# Patient Record
Sex: FEMALE | Race: WHITE | Employment: OTHER | URBAN - METROPOLITAN AREA
[De-identification: names, ages, dates, MRNs, and addresses within clinical notes are randomized per-mention and may not be internally consistent; named-entity substitution may affect disease eponyms.]

---

## 2022-03-19 PROBLEM — R05.3 CHRONIC COUGH: Status: ACTIVE | Noted: 2019-06-10

## 2022-05-26 DIAGNOSIS — E03.9 PRIMARY HYPOTHYROIDISM: Primary | ICD-10-CM

## 2022-06-09 ENCOUNTER — HOSPITAL ENCOUNTER (OUTPATIENT)
Dept: GENERAL RADIOLOGY | Age: 67
Discharge: HOME | End: 2022-06-12
Payer: COMMERCIAL

## 2022-06-09 DIAGNOSIS — R05.9 COUGH: ICD-10-CM

## 2022-06-09 DIAGNOSIS — K21.9 GASTROESOPHAGEAL REFLUX DISEASE WITHOUT ESOPHAGITIS: ICD-10-CM

## 2022-06-09 DIAGNOSIS — R49.0 DYSPHONIA: ICD-10-CM

## 2022-06-09 PROCEDURE — 74230 X-RAY XM SWLNG FUNCJ C+: CPT

## 2022-06-09 PROCEDURE — 2500000003 HC RX 250 WO HCPCS: Performed by: OTOLARYNGOLOGY

## 2022-06-09 PROCEDURE — 92611 MOTION FLUOROSCOPY/SWALLOW: CPT

## 2022-06-09 RX ADMIN — BARIUM SULFATE 15 ML: 400 PASTE ORAL at 09:16

## 2022-06-09 RX ADMIN — BARIUM SULFATE 45 ML: 980 POWDER, FOR SUSPENSION ORAL at 09:16

## 2022-06-09 NOTE — THERAPY EVALUATION
Efren Bradford  : 1955  Primary: [facility]  Secondary:   MRN: [id number] [facility] [id number]  Phone: [phone]    Visit Info:    Date 2022   SPEECH LANGUAGE PATHOLOGY: MODIFIED BARIUM SWALLOW STUDY     Appt Desk   Episode      Treatment Diagnosis:  R13.14 Dysphagia, Pharyngoesophageal Phase    Medical/Referring Diagnosis:  Cough [R05.9]  Dysphonia [R49.0]  Gastroesophageal reflux disease without esophagitis [K21.9]  Referring Physician:  Neal Flores DO  Radiologist: Dr. Marycarmen Rodriguez MD Orders: Modified Barium Swallow  Date of Onset:  No data recorded   Allergies:  Morphine and Tramadol         PAST MEDICAL HISTORY:   Ms. Joaquin Zuluaga is a 79 y.o. female who  has a past medical history of Cervical disc disease, Chronic cough, Hypertension, Multiple thyroid nodules, Nephrolithiasis, and Primary hypothyroidism. She also  has a past surgical history that includes Hysterectomy; Salpingo-oophorectomy; Cervical discectomy; lumbar discectomy; heent; knee surgery (Left, 2021); Septoplasty; other surgical history; Lithotripsy; bladder suspension; and Cholecystectomy. ASSESSMENT/PLAN OF CARE   Based on the objective data described below, Ms. Joaquin Zuluaga presents with delayed hyolaryngeal elevation but adequate epiglottal inversion. There was intermittent trace transient laryngeal penetration of thin liquids with no aspiration. RECOMMENDATIONS AND PLANNED INTERVENTIONS  DIET:    Regular Consistency   Thin Liquids    MEDICATIONS: As tolerated and Whole with thin liquids    Compensatory Swallowing Strategies/Modifications:   Small bites and sips   Slow rate of PO intake   Bolus hold to improve timing of swallow      GENERAL    Patient pleasant and cooperative. Present dysphagia symptoms: She currently complains of chronic cough that has recently been more pronounced with PO intake.   Current dietary status prior to evaluation today: regular textures, thin liquids  Previous dysphagia or speech therapy intervention (including previous MBS studies): patient has had speech therapy for her cough in the past    OBJECTIVE    Modified barium swallow study was performed in the radiology suite using c-arm with Ms. Carol Tipton in the lateral plane. Oral Motor Assessment  Labial: No impairment  Lingual: No impairment  Dentition: Natural  Oral Hygiene: Adequate    PO trials  Patient was presented with trials of thin liquids (by spoon, cup sip, cup gulp, straw sip and serial swallows), pudding, mixed consistency and cracker. Oral phase:   no significant oral issues observed   premature spillage to pharynx pre-swallow   piecemeal deglutition    Pharyngeal phase:   Swallows of thin liquids were triggered at pyriform sinuses. There was trace and transient laryngeal penetration observed during the swallow intermittently that was cleared spontaneously during the swallow. No aspiration was observed. No pharyngeal residue after swallow.  Swallows of pudding were timely. No laryngeal penetration or aspiration was observed. No pharyngeal residue after swallow.  Swallows of mixed consistencies were piecemealed, triggered at valleculae and with pooling in valleculae and pyriform sinuses for 3-5 seconds prior to swallow initiation. No laryngeal penetration or aspiration was observed. No pharyngeal residue after swallow.  Swallows of cracker were triggered at valleculae and with pooling in valleculae for 3 seconds prior to swallow initiation. No laryngeal penetration or aspiration was observed. No pharyngeal residue after swallow. Pharyngeal characteristics:   functional pharyngeal swallow   adequate retraction of base of tongue   delayed hyolaryngeal elevation/excursion   adequate epiglottic inversion   adequate constriction of posterior pharyngeal wall   reduced laryngeal closure  Aspiration/Penetration Scale:    2 (Penetration/no residue.  Contrast enters the larynx, remains above the folds/cords, and is cleared.)    Upper Esophageal Screen:    decreased relaxation of upper segment of esophagus that does not affect flow  *Distal esophagus not assessed due to limitations of modified barium swallow study. National Outcomes Measure:   SWALLOWING: Level 6:  Swallowing is safe, and the individual eats and drinks independently and may rarely require minimal cueing. The individual usually self-cues when difficulty occurs. May need to avoid specific food items (e.g., popcorn and nuts), or require additional time (due to dysphagia). Patient Education regarding results and recommendations:   Ms. Linda Rosas was educated on the following topics: anatomy and physiology of the swallowing mechanism and results and recommendations from this assessment. All questions were answered and comprehension of education was expressed.     Therapy Time  SLP Individual Minutes  Time In: 0900  Time Out: 0930  Minutes: [address] CARMITA [address]ano

## 2022-06-16 ENCOUNTER — OFFICE VISIT (OUTPATIENT)
Dept: ENDOCRINOLOGY | Age: 67
End: 2022-06-16
Payer: COMMERCIAL

## 2022-06-16 VITALS
SYSTOLIC BLOOD PRESSURE: 154 MMHG | BODY MASS INDEX: 25.16 KG/M2 | WEIGHT: 151 LBS | OXYGEN SATURATION: 96 % | HEIGHT: 65 IN | DIASTOLIC BLOOD PRESSURE: 82 MMHG | HEART RATE: 70 BPM

## 2022-06-16 DIAGNOSIS — E03.9 PRIMARY HYPOTHYROIDISM: Primary | ICD-10-CM

## 2022-06-16 DIAGNOSIS — E04.2 MULTIPLE THYROID NODULES: ICD-10-CM

## 2022-06-16 PROCEDURE — 76536 US EXAM OF HEAD AND NECK: CPT | Performed by: INTERNAL MEDICINE

## 2022-06-16 PROCEDURE — 1123F ACP DISCUSS/DSCN MKR DOCD: CPT | Performed by: INTERNAL MEDICINE

## 2022-06-16 PROCEDURE — 99214 OFFICE O/P EST MOD 30 MIN: CPT | Performed by: INTERNAL MEDICINE

## 2022-06-16 RX ORDER — DIPHENHYDRAMINE HCL 25 MG
25 CAPSULE ORAL EVERY 6 HOURS PRN
COMMUNITY

## 2022-06-16 RX ORDER — PREGABALIN 75 MG/1
CAPSULE ORAL
COMMUNITY
Start: 2022-04-20

## 2022-06-16 RX ORDER — LEVOTHYROXINE SODIUM 0.05 MG/1
50 TABLET ORAL
Qty: 30 TABLET | Refills: 11 | Status: SHIPPED | OUTPATIENT
Start: 2022-06-16 | End: 2023-06-16

## 2022-06-16 ASSESSMENT — ENCOUNTER SYMPTOMS
TROUBLE SWALLOWING: 0
VOICE CHANGE: 0

## 2022-06-16 NOTE — PROGRESS NOTES
/    Loretta Perkins MD, [address]            Reason for visit: follow-up of hypothyroidism and thyroid nodules      ASSESSMENT AND PLAN:    1. Primary hypothyroidism  She has biochemically euthyroid. Continue levothyroxine as prescribed. Return in 1 year with repeat labs. - levothyroxine (SYNTHROID) 50 MCG tablet; Take 1 tablet by mouth every morning (before breakfast)  Dispense: 30 tablet; Refill: 11  - TSH; Future  - T4, Free; Future    2. Multiple thyroid nodules  Ultrasound was repeated today (see below) and is unchanged. Ongoing ultrasound follow-up is not necessary.  - US,HEAD/NECK TISSUES,REAL TIME      PROCEDURES:    HEAD AND NECK ULTRASOUND    Date of study:  6/16/2022    Performing/interpreting physician:  Loretta Perkins MD, FACE    Indication:  thyroid nodule    Technique:  Using a 12 MHz linear transducer, multiple real-time planar images were obtained of the thyroid and surrounding tissues. Findings:  Right lobe 0.84 x 0.99 x 3.50 cm, isthmus 0.31 cm, left lobe 0.75 x 0.96 x 1.84 cm. Homogeneous echotexture. Normal blood flow. 0.18 x 0.24 x 0.30 cm hypoechoic nodule without calcifications or increased blood flow in the midportion of the right lobe (TR 4). 0.39 x 0.37 x 0.52 cm hypoechoic nodule without calcifications or increased blood flow in the mid to lower portion of the left lobe (TR 4). Impression: Subcentimeter TI-RADS 4 nodules. Follow-up and Dispositions    · Return in about 1 year (around 6/16/2023). History of Present Illness:    THYROID DYSFUNCTION  Andrew Bolton is seen for follow-up of hypothyroidism; this was diagnosed in 2017.   She was started on thyroid hormone replacement in December 2018.       Current symptoms: See review of systems below     Prior treatment: Levothyroxine 50 mcg daily was started 12/7/2018.     Pertinent labs:  3/29/2017: TSH 6.230.  8/19/2017: TSH 6.850.  6/21/2018: TSH 5.280.  2018: TSH 9.790.  2019: TSH 1.710, free T4 1.51, antithyroid peroxidase antibodies 18 (-).  2019: TSH 2.48, free T4 1.3.  10/7/2019: TSH 2.83.  2019: TSH 1.41, free T4 1.32.  2020: TSH 1.380, free T4 1.25.  6/10/2021: TSH 2.020, free T4 1.47.  2022: TSH 4.400, free T4 1.02.     [facility] Right lobe 4.2 x 1.2 x 1.0 cm, isthmus 0.25 cm, left lobe 2.9 x 1.0 x 1.0 cm. Multiple bilateral thyroid nodules measuring less than 0.5 cm.     [facility] 24-hour uptake 24.9%. Homogeneous tracer distribution.     2018: Ultrasound ([facility])- Right lobe 1.17 x 1.10 x 3.69 cm, isthmus 0.31 cm, left lobe 1.00 x 1.10 x 3.41 cm. Homogeneous echotexture. 0.17 x 0.22 x 0.29 cm hypoechoic nodule without calcifications or increased blood flow in the midportion of the right lobe. 0.29 x 0.32 x 0.39 cm hypoechoic nodule with sonolucent rim and no calcifications or increased blood flow in the midportion of the left lobe.     6/10/2019: Ultrasound ([facility])- Right lobe 1.19 x 1.15 x 3.70 cm, isthmus 0.18 cm, left lobe 1.22 x 0.97 x 2.63 cm. Mildly heterogeneous echotexture. 0.16 x 0.19 x 0.26 cm hypoechoic nodule in the midportion of the right lobe. 0.26 x 0.31 x 0.36 cm hypoechoic nodule with sonolucent rim and no calcifications or increased blood flow in the midportion of the left lobe.     2020: Ultrasound ([facility])- Right lobe 1.56 x 1.28 x 3.89 cm, isthmus 0.31 cm, left lobe 0.94 x 1.06 x 2.78 cm. Homogeneous echotexture. Normal blood flow. 0.12 x 0.22 x 0.23 cm hypoechoic nodule without calcifications or increased blood flow in the midportion of the right lobe. 0.26 x 0.28 x 0.45 cm hypoechoic nodule without calcifications or increased blood flow in the mid to lower portion of the left lobe.     2022: Ultrasound ([facility])- Right lobe 0.84 x 0.99 x 3.50 cm, isthmus 0.31 cm, left lobe 0.75 x 0.96 x 1.84 cm. Homogeneous echotexture. Normal blood flow. 0.18 x 0.24 x 0.30 cm hypoechoic nodule without calcifications or increased blood flow in the midportion of the right lobe (TR 4). 0.39 x 0.37 x 0.52 cm hypoechoic nodule without calcifications or increased blood flow in the mid to lower portion of the left lobe (TR 4). Review of Systems   Constitutional: Negative for fatigue and unexpected weight change. HENT: Negative for trouble swallowing and voice change. BP (!) 154/82   Pulse 70   Ht 5' 5\" (1.651 m)   Wt 151 lb (68.5 kg)   SpO2 96%   BMI 25.13 kg/m²   Wt Readings from Last 3 Encounters:   06/16/22 151 lb (68.5 kg)   06/16/21 150 lb (68 kg)       Physical Exam  HENT:      Head: Normocephalic. Neck:      Thyroid: No thyroid mass or thyromegaly. Cardiovascular:      Rate and Rhythm: Normal rate. Pulmonary:      Effort: No respiratory distress. Breath sounds: Normal breath sounds. Neurological:      Mental Status: She is alert.    Psychiatric:         Mood and Affect: Mood normal.         Behavior: Behavior normal.         Orders Placed This Encounter   Procedures    US,HEAD/NECK TISSUES,REAL TIME    TSH     Standing Status:   Future     Standing Expiration Date:   6/16/2024    T4, Free     Standing Status:   Future     Standing Expiration Date:   6/16/2024       Current Outpatient Medications   Medication Sig Dispense Refill    pregabalin (LYRICA) 75 MG capsule TAKE 1 CAPSULE BY MOUTH TWICE DAILY      diphenhydrAMINE (BENADRYL ALLERGY) 25 MG capsule Take 25 mg by mouth every 6 hours as needed for Itching Indications: taken with Tramadol      levothyroxine (SYNTHROID) 50 MCG tablet Take 1 tablet by mouth every morning (before breakfast) 30 tablet 11    celecoxib (CELEBREX) 200 MG capsule TK ONE C PO QD WITH FOOD      ergocalciferol (ERGOCALCIFEROL) 1.25 MG (65453 UT) capsule Take 50,000 Units by mouth      ibuprofen (ADVIL;MOTRIN) 200 MG CAPS Take 400 mg by mouth as needed      olmesartan (BENICAR) 20 MG tablet Take 20 mg by mouth daily      omeprazole (PRILOSEC) 40 MG delayed release capsule Take 40 mg by mouth daily      traMADol (ULTRAM) 50 MG tablet TAKE 1 TABLET BY MOUTH TWICE DAILY       No current facility-administered medications for this visit. Asaf Cheney MD, FACE      Portions of this note were generated with the assistance of voice recognition software. As such, some errors in transcription may be present.

## 2022-08-25 RX ORDER — OMEPRAZOLE 40 MG/1
CAPSULE, DELAYED RELEASE ORAL
Qty: 30 CAPSULE | OUTPATIENT
Start: 2022-08-25

## 2023-06-09 DIAGNOSIS — E03.9 PRIMARY HYPOTHYROIDISM: ICD-10-CM

## 2023-06-12 RX ORDER — LEVOTHYROXINE SODIUM 0.05 MG/1
TABLET ORAL
Qty: 30 TABLET | Refills: 11 | OUTPATIENT
Start: 2023-06-12

## 2024-06-17 ENCOUNTER — OFFICE VISIT (OUTPATIENT)
Dept: ENDOCRINOLOGY | Age: 69
End: 2024-06-17
Payer: MEDICARE

## 2024-06-17 VITALS
SYSTOLIC BLOOD PRESSURE: 121 MMHG | DIASTOLIC BLOOD PRESSURE: 80 MMHG | WEIGHT: 146 LBS | OXYGEN SATURATION: 98 % | HEART RATE: 87 BPM | BODY MASS INDEX: 24.3 KG/M2

## 2024-06-17 DIAGNOSIS — E04.2 MULTIPLE THYROID NODULES: ICD-10-CM

## 2024-06-17 DIAGNOSIS — E03.9 PRIMARY HYPOTHYROIDISM: Primary | ICD-10-CM

## 2024-06-17 PROCEDURE — 1123F ACP DISCUSS/DSCN MKR DOCD: CPT | Performed by: INTERNAL MEDICINE

## 2024-06-17 PROCEDURE — G2211 COMPLEX E/M VISIT ADD ON: HCPCS | Performed by: INTERNAL MEDICINE

## 2024-06-17 PROCEDURE — 99213 OFFICE O/P EST LOW 20 MIN: CPT | Performed by: INTERNAL MEDICINE

## 2024-06-17 RX ORDER — LEVOTHYROXINE SODIUM 0.05 MG/1
50 TABLET ORAL
Qty: 90 TABLET | Refills: 3 | Status: SHIPPED | OUTPATIENT
Start: 2024-06-17 | End: 2025-06-17

## 2024-06-17 RX ORDER — LEVOTHYROXINE SODIUM 0.05 MG/1
50 TABLET ORAL
Qty: 30 TABLET | Refills: 11
Start: 2024-06-17 | End: 2024-06-17 | Stop reason: SDUPTHER

## 2024-06-17 ASSESSMENT — ENCOUNTER SYMPTOMS
VOICE CHANGE: 0
COUGH: 1
TROUBLE SWALLOWING: 0

## 2024-06-17 NOTE — PROGRESS NOTES
/    MILTON [facility] MD, [facility] ENDOCRINOLOGY   AND   THYROID NODULE CLINIC            Reason for visit: follow-up of hypothyroidism and thyroid nodules      ASSESSMENT AND PLAN:    1. Primary hypothyroidism  She is biochemically euthyroid.  Continue levothyroxine as prescribed.  Return in 1 year with repeat labs.  I will provide her with longitudinal care for this problem.  - levothyroxine (SYNTHROID) 50 MCG tablet; Take 1 tablet by mouth every morning (before breakfast)  Dispense: 30 tablet; Refill: 11  - TSH; Future  - T4, Free; Future    2. Multiple thyroid nodules  Ultrasound was repeated most recently in 2022 and was unchanged.  Ongoing ultrasound follow-up is not necessary.          Follow-up and Dispositions    Return in about 1 year (around 2025).             History of Present Illness:    THYROID DYSFUNCTION  Jaimee Tony is seen for follow-up of hypothyroidism; this was diagnosed in .  She was started on thyroid hormone replacement in 2018.       Current symptoms: See review of systems below     Prior treatment: Levothyroxine 50 mcg daily was started 2018.     Pertinent labs:  3/29/2017: TSH 6.230.  2017: TSH 6.850.  2018: TSH 5.280.  2018: TSH 9.790.  2019: TSH 1.710, free T4 1.51, antithyroid peroxidase antibodies 18 (-).  2019: TSH 2.48, free T4 1.3.  10/7/2019: TSH 2.83.  2019: TSH 1.41, free T4 1.32.  2020: TSH 1.380, free T4 1.25.  6/10/2021: TSH 2.020, free T4 1.47.  2022: TSH 4.400, free T4 1.02.  2023: TSH 4.340, free T4 1.11.  10/13/2023: TSH 2.43.  2024: TSH 3.95, free T4 1.0.     Imagin2017: Ultrasound ([facility])- Right lobe 4.2 x 1.2 x 1.0 cm, isthmus 0.25 cm, left lobe 2.9 x 1.0 x 1.0 cm.  Multiple bilateral thyroid nodules measuring less than 0.5 cm.     2017: I-123 uptake and scan ([facility])- 24-hour uptake 24.9%.  Homogeneous tracer distribution.     2018: